# Patient Record
Sex: MALE | Race: WHITE
[De-identification: names, ages, dates, MRNs, and addresses within clinical notes are randomized per-mention and may not be internally consistent; named-entity substitution may affect disease eponyms.]

---

## 2018-01-22 ENCOUNTER — HOSPITAL ENCOUNTER (EMERGENCY)
Dept: HOSPITAL 74 - FER | Age: 58
Discharge: HOME | End: 2018-01-22
Payer: COMMERCIAL

## 2018-01-22 VITALS — HEART RATE: 83 BPM | TEMPERATURE: 98.2 F

## 2018-01-22 VITALS — BODY MASS INDEX: 33.2 KG/M2

## 2018-01-22 VITALS — SYSTOLIC BLOOD PRESSURE: 138 MMHG | DIASTOLIC BLOOD PRESSURE: 105 MMHG

## 2018-01-22 DIAGNOSIS — J32.9: ICD-10-CM

## 2018-01-22 DIAGNOSIS — J40: ICD-10-CM

## 2018-01-22 DIAGNOSIS — J06.9: Primary | ICD-10-CM

## 2018-01-22 NOTE — PDOC
History of Present Illness





- General


Chief Complaint: Cold Symptoms


Stated Complaint: cough


Time Seen by Provider: 01/22/18 09:46





- History of Present Illness


Initial Comments: 





01/22/18 09:58


57-year-old male denies significant past medical history presents with 3 weeks 

of intermittent dry cough and left sinus pain. The patient reports the cough 

was intermittently productive of white clear sputum but now has become a dry 

cough that at times keeps him up at night. He reports a few weeks ago he had a 

runny nose but that has since resolved. In addition he reports feeling a little 

bit run down but has been able to go to work. He presents to the emergency 

department today because he does not have a general practitioner. Denies any 

fevers, chills, headache, neck stiffness, dizziness, bodyaches, chest pain, 

shortness of breath, abdominal pain, nausea, vomiting, diarrhea, lower 

extremity edema, focal weakness or numbness. Denies recent travel. Has a sick 

contact at work with similar symptoms. He has tried homeopathic remedy for 

coughing which has not helped.











Past History





- Past Medical History


Allergies/Adverse Reactions: 


 Allergies











Allergy/AdvReac Type Severity Reaction Status Date / Time


 


No Known Allergies Allergy   Verified 01/22/18 09:45











Home Medications: 


Ambulatory Orders





NK [No Known Home Medication]  01/22/18 








COPD: No


GI Disorders: Yes (ULCER)





- Suicide/Smoking/Psychosocial Hx


Smoking History: Former smoker


Have you smoked in the past 12 months: No


Information on smoking cessation initiated: No


Hx Alcohol Use: Yes


Drug/Substance Use Hx: No


Substance Use Type: None





**Review of Systems





- Review of Systems


Comments:: 





01/22/18 09:59


GENERAL/CONSTITUTIONAL: No fever or chills. No weakness.


HEAD, EYES, EARS, NOSE AND THROAT: No change in vision. No ear pain or 

discharge. No sore throat. +sinus pain


GASTROINTESTINAL: No nausea, vomiting, diarrhea or constipation.


GENITOURINARY: No dysuria, frequency, or change in urination.


CARDIOVASCULAR: No chest pain or shortness of breath.


RESPIRATORY: +cough, no wheezing, or hemoptysis.


MUSCULOSKELETAL: No joint or muscle swelling or pain. No neck or back pain.


SKIN: No rash


NEUROLOGIC: No headache, vertigo, loss of consciousness, or change in strength/

sensation.


ENDOCRINE: No increased thirst. No abnormal weight change.


HEMATOLOGIC/LYMPHATIC: No anemia, easy bleeding, or history of blood clots.


ALLERGIC/IMMUNOLOGIC: No hives or skin allergy.


 





*Physical Exam





- Vital Signs


 Last Vital Signs











Temp Pulse Resp BP Pulse Ox


 


 98.2 F   83   18   136/104   99 


 


 01/22/18 09:44  01/22/18 09:44  01/22/18 09:44  01/22/18 09:44  01/22/18 09:44














- Physical Exam


Comments: 





01/22/18 10:00


GENERAL: Awake, alert, and fully oriented, in no acute distress


HEAD: No signs of trauma, no ttp to any of facial sinuses


EYES: PERRLA, EOMI, sclera anicteric, conjunctiva clear


ENT: Auricles normal inspection, hearing grossly normal, nares patent, 

oropharynx clear without exudates. Moist mucosa


NECK: Normal ROM, supple, no lymphadenopathy, JVD, or masses


LUNGS: Breath sounds equal, clear to auscultation bilaterally.  No wheezes, and 

no crackles


HEART: Regular rate and rhythm, normal S1 and S2, no murmurs, rubs or gallops


ABDOMEN: Soft, nontender, normoactive bowel sounds.  No guarding, no rebound.  

No masses


EXTREMITIES: Normal range of motion, no edema.  No clubbing or cyanosis. No 

cords, erythema, or tenderness


NEUROLOGICAL:  Normal speech, cranial nerves intact, negative pronator drift, 5/

5 strength in all 4 extremities, normal sensation to light touch in all 4 

extremities, normal cerebellar exam, normal gait, normal reflexes and tone


SKIN: Warm, Dry, normal turgor, no rashes or lesions noted.








Medical Decision Making





- Medical Decision Making





01/22/18 10:01


57-year-old male presents with 3 weeks of dry cough and left-sided facial pain. 

Vitals remarkable for elevated BP (134/104). Exam completely normal, with clear 

lungs and no crackles. Likely URI with bronchitis and possibly sinusitis. Given 

the length of symptoms, will treat with azithromycin. Will also provide the 

patient with information for the family medicine practice as he does not have a 

primary doctor. Prior to DC will recheck BP.





01/22/18 10:06


Repeat /105, diastolic BP elevated to 105, discussed with pt that he 

should have his BP rechecked when he follows up with Dr. Bowser as he may need 

to be on antihypertensive medications. 





I discussed the physical exam findings, ancillary test results and final 

diagnoses with the patient. I answered all of the patient's questions. The 

patient was satisfied with the care received and felt comfortable with the 

discharge plan and treatment plan. The patient will call their primary care 

physician within 24 hours to arrange follow-up and will return to the Emergency 

Department with any new, persistent or worsening symptoms. 





*DC/Admit/Observation/Transfer


Diagnosis at time of Disposition: 


 Upper respiratory infection, Bronchitis, Sinusitis








- Discharge Dispostion


Disposition: HOME


Condition at time of disposition: Stable


Admit: No





- Referrals





- Patient Instructions


Printed Discharge Instructions:  DI for Acute Bronchitis, DI for Viral Upper 

Respiratory Infection -- Adult


Additional Instructions: 


Follow-up with Dr. Bowser within 1-2 days. Your blood pressure was elevated in 

the emergency department, make sure that to have your blood pressure rechecked 

when you follow-up with Dr. Bowser. Take your medications as prescribed. It may 

also be helpful to get a humidifier as dry weather can make a cough worse. 

Return to the emergency department if you have any new, worsening or concerning 

symptoms.





- Post Discharge Activity





- Attestations


Physician Attestion: 





01/22/18 10:05








I, Dr. Will Light MD, attest that this document has been prepared under my 

direction and personally reviewed by me in its entirety.   I further attest, 

that it accurately reflects all work, treatment, procedures and medical decision

-making performed by me.

## 2025-01-07 ENCOUNTER — OFFICE (OUTPATIENT)
Dept: URBAN - METROPOLITAN AREA CLINIC 86 | Facility: CLINIC | Age: 65
Setting detail: OPHTHALMOLOGY
End: 2025-01-07

## 2025-01-07 DIAGNOSIS — H47.012: ICD-10-CM

## 2025-01-07 DIAGNOSIS — H47.10: ICD-10-CM

## 2025-01-07 DIAGNOSIS — H16.223: ICD-10-CM

## 2025-01-07 PROCEDURE — 99204 OFFICE O/P NEW MOD 45 MIN: CPT | Performed by: OPHTHALMOLOGY

## 2025-01-07 ASSESSMENT — VISUAL ACUITY
OS_BCVA: 20/20
OD_BCVA: 20/25-

## 2025-01-07 ASSESSMENT — REFRACTION_AUTOREFRACTION
OS_AXIS: 180
OD_CYLINDER: +0.75
OS_CYLINDER: +1.25
OD_AXIS: 180
OD_AXIS: 15
OD_SPHERE: -0.75
OS_CYLINDER: +1.50
OS_SPHERE: -0.25
OD_SPHERE: -1.00
OS_AXIS: 180
OD_CYLINDER: +0.75
OS_SPHERE: -0.25

## 2025-01-07 ASSESSMENT — REFRACTION_CURRENTRX
OD_OVR_VA: 20/
OD_SPHERE: -1.00
OS_SPHERE: -1.00
OS_AXIS: 180
OD_VPRISM_DIRECTION: SV
OS_OVR_VA: 20/
OS_VPRISM_DIRECTION: SV
OS_CYLINDER: +1.25

## 2025-01-07 ASSESSMENT — TEAR BREAK UP TIME (TBUT)
OD_TBUT: T
OS_TBUT: 1+

## 2025-01-07 ASSESSMENT — CONFRONTATIONAL VISUAL FIELD TEST (CVF)
OS_FINDINGS: FULL
OD_FINDINGS: FULL

## 2025-01-07 ASSESSMENT — TONOMETRY
OS_IOP_MMHG: 18
OD_IOP_MMHG: 18

## 2025-01-08 ENCOUNTER — APPOINTMENT (OUTPATIENT)
Dept: FAMILY MEDICINE | Facility: CLINIC | Age: 65
End: 2025-01-08